# Patient Record
Sex: FEMALE | Race: ASIAN | NOT HISPANIC OR LATINO | URBAN - METROPOLITAN AREA
[De-identification: names, ages, dates, MRNs, and addresses within clinical notes are randomized per-mention and may not be internally consistent; named-entity substitution may affect disease eponyms.]

---

## 2022-08-09 ENCOUNTER — OFFICE (OUTPATIENT)
Dept: URBAN - METROPOLITAN AREA CLINIC 11 | Facility: CLINIC | Age: 37
End: 2022-08-09

## 2022-08-09 VITALS
SYSTOLIC BLOOD PRESSURE: 101 MMHG | HEIGHT: 68 IN | HEART RATE: 63 BPM | RESPIRATION RATE: 16 BRPM | WEIGHT: 124 LBS | OXYGEN SATURATION: 100 % | DIASTOLIC BLOOD PRESSURE: 65 MMHG

## 2022-08-09 DIAGNOSIS — R07.9 CHEST PAIN, UNSPECIFIED: ICD-10-CM

## 2022-08-09 LAB
H PYLORI BREATH TEST: POSITIVE
H. PYLORI BREATH COLLECTION: (no result)

## 2022-08-09 PROCEDURE — 99204 OFFICE O/P NEW MOD 45 MIN: CPT

## 2022-08-09 RX ORDER — OMEPRAZOLE 40 MG/1
40 CAPSULE, DELAYED RELEASE ORAL
Qty: 30 | Refills: 2 | Status: ACTIVE
Start: 2022-08-09

## 2022-08-09 NOTE — SERVICEHPINOTES
Patient is a 38 year old  female with a past medical history of hypothyroidism.  She endorses a 1 month history of chest tightness. This pain was initially constant. It did not radiate. It was not worsened by food or exertion. She does note that the pain seemed to be worsened by breathing. She denies any other coinciding symptoms. She took 7-10 days of pantoprazole without relief and saw her PCP. She was sent prescriptions for a Medrol dose pack, albuterol inhaler, and omeprazole. Further diagnostic workup was additionally ordered, but we do not have those records for review. She notes that the pain improved after the steroids. She took 1 dose of the omeprazole and discontinued due to improvement of her symptoms after steroids. She did not trial the albuterol inhaler.   She denies NSAID use. Her pain is still occurring daily, intermittently and randomly throughout the day. She notes an improvement in her pain now upon taking a deep breath. She does consider herself to be an anxious and stress individual. She is vegetarian and considers herself to have a healthy diet. 
dolores bernal
She denies fevers, chills, nausea, vomiting, overt bleeding, abdominal pain, changes in bowel habits, radiating chest pain, numbness, tingling, unexplained weight loss, dysphagia or reflux. She has a history of 2 spontaneous vaginal deliveries and an umbilical hernia repair in 2019. She denies history of tobacco, etoh, or illicit drug use.  She denies personal history of pulmonary or cardiovascular disease. She denies personal or family history of GI-related cancers. She has never had a colonoscopy or EGD in the past. She has never seen a GI provider historically. Patient recently had CBC, CMP, thyroid panel, and ESR, COVID test, H pylori (although she was on pantoprazole at the time) and EKG, patient reports all of these were negative. She also reports having a thyroid ultrasound and chest x-ray which were also negative. We do not yet have these records but we will obtain these from Dr Wynne's office.dolores

## 2022-08-09 NOTE — SERVICENOTES
I am not sure the patient's symptoms are GI in nature. The described pain is atypical for cardiac pain; however, is not out of the realm of possibility for for this to be cardiac, as females typically present with more atypical symptoms. She described as upper chest tightness, No radiation, palpitations or subjective tachycardia. Pain is not related to physical activity or exertion. Symptoms also improved after steroid regimen prescribed by PCP.  I suspect this is more likely respiratory in etiology. We will re-test for h pylori today since she has been off PPI for at least 2 weeks and recommend that she resume her omeprazole daily for 8 weeks.